# Patient Record
Sex: MALE | Race: WHITE | Employment: FULL TIME | URBAN - METROPOLITAN AREA
[De-identification: names, ages, dates, MRNs, and addresses within clinical notes are randomized per-mention and may not be internally consistent; named-entity substitution may affect disease eponyms.]

---

## 2020-04-14 ENCOUNTER — APPOINTMENT (OUTPATIENT)
Dept: GENERAL RADIOLOGY | Age: 35
End: 2020-04-14

## 2020-04-14 ENCOUNTER — HOSPITAL ENCOUNTER (EMERGENCY)
Age: 35
Discharge: HOME | End: 2020-04-14
Attending: EMERGENCY MEDICINE

## 2020-04-14 VITALS
TEMPERATURE: 98.9 F | HEART RATE: 97 BPM | OXYGEN SATURATION: 99 % | WEIGHT: 284.39 LBS | BODY MASS INDEX: 39.81 KG/M2 | RESPIRATION RATE: 20 BRPM | DIASTOLIC BLOOD PRESSURE: 87 MMHG | HEIGHT: 71 IN | SYSTOLIC BLOOD PRESSURE: 135 MMHG

## 2020-04-14 LAB
RAPID INFLUENZA  B AGN: NEGATIVE
RAPID INFLUENZA A AGN: NEGATIVE

## 2020-04-14 PROCEDURE — 99284 EMERGENCY DEPT VISIT MOD MDM: CPT

## 2020-04-14 PROCEDURE — 87804 INFLUENZA ASSAY W/OPTIC: CPT

## 2020-04-14 PROCEDURE — 71045 X-RAY EXAM CHEST 1 VIEW: CPT

## 2020-04-14 ASSESSMENT — ENCOUNTER SYMPTOMS
WHEEZING: 0
PHOTOPHOBIA: 0
FACIAL SWELLING: 0
BACK PAIN: 0
COLOR CHANGE: 0
VOMITING: 0
SHORTNESS OF BREATH: 1
BLOOD IN STOOL: 0
TROUBLE SWALLOWING: 0
VOICE CHANGE: 0
NAUSEA: 0
STRIDOR: 0
ABDOMINAL PAIN: 0
COUGH: 1

## 2020-04-14 NOTE — ED PROVIDER NOTES
[facility]  eMERGENCY dEPARTMENT eNCOUnter      Pt Name: Teri Pruitt  MRN: [id number]  [place] 1985  Date of evaluation: 4/14/2020  Provider: Carolee Barron MD    [address] [place] [address]       Chief Complaint   Patient presents with    Cough     unproductive cough on and off for few weeks worse today    Shortness of Breath     started today          HISTORY OF PRESENT ILLNESS   (Location/Symptom, Timing/Onset, Context/Setting, Quality, Duration, Modifying Factors, Severity)  Note limiting factors. Teri Pruitt is a 36 y.o. male who denies any significant past medical history specifically denying any chronic lung disease or immunosuppression who presents with on and off nonproductive cough for approximately 2 weeks which became worse today. He also reports shortness of breath started today. The patient denies any fever, body aches, chest pain, hemoptysis, or leg swelling. He denies any syncope or severe increased work of breathing. He reports that his cough and shortness breath are moderate, constant, and worsening. He denies any known aggravating or alleviating factors. HPI    Nursing Notes were reviewed. REVIEW OFSYSTEMS    (2-9 systems for level 4, 10 or more for level 5)     Review of Systems   Constitutional: Negative for appetite change, fever and unexpected weight change. HENT: Negative for facial swelling, trouble swallowing and voice change. Eyes: Negative for photophobia and visual disturbance. Respiratory: Positive for cough and shortness of breath. Negative for wheezing and stridor. Cardiovascular: Negative for chest pain and palpitations. Gastrointestinal: Negative for abdominal pain, blood in stool, nausea and vomiting. Genitourinary: Negative for difficulty urinating and dysuria. Musculoskeletal: Negative for back pain, gait problem and neck pain. Skin: Negative for color change and wound.    Neurological: Negative for seizures, syncope and strict ER return precautions. Covid Decompensation Risk Scale    Screen for Imminent Risk:  -O2 Sat <95%, SBP <100 or DBP <60? NO  -RR >22? NO  -Age 61+ AND Pulse >100 at time of disposition? NO fir age      Clinical Risk Score:                                                                                                                       -Age: <49 = 0 pts; 50-59 = 2 pts; 60-69 = 3 pts;70+ = 4 pts                                                                  -Tachycardia, Long Term Care facility = 4 pts each    -Non-exertional Dyspnea or change in exercise tolerance: 6 pts    -Symptom onset (Days): <5 or >10 = 0 pts; 5-10 = 4 pts    -Male, CAD, DM, cancer, immunosuppression, cerebrovascular dz,  chronic lung dz*(exclude Asthma), chronic renal dz, chronic liver dz= 1 pt each    Total 5    Score ? 8: Low Risk  Workup: CXR ? Normal = low risk  If CXR shows infiltrate go to Intermediate Risk    Score 9-12: Intermediate Risk  Workup: CBC, CMP, Troponin, LDH, Lactate, CXR  If any below present, go to High Risk  GCS <15, > mild infiltrate, lobar infiltrate, multi-lobar infiltrate, lymphopenia, ? LDH, ? troponin, ?lactate, thrombocytopenia. Score >12:  High Risk? Admit. Procedures    FINAL IMPRESSION      1. Cough    2. Shortness of breath          DISPOSITION/PLAN   DISPOSITION Decision To Discharge 04/14/2020 03:50:40 PM      PATIENT REFERRED TO:  María Houser  [phone]  In 1 week  Ask for an appointment with a primary care doctor    [facility]  [address]  [phone]    If symptoms worsen        (Please note that portions of this note were completed with a voice recognition program.  Efforts were made to edit the dictations but occasionally words aremis-transcribed. )    Prince Jessica MD (electronically signed)  Attending Emergency Physician         Prince Jessica MD  04/14/20 1600

## 2020-04-15 ENCOUNTER — CARE COORDINATION (OUTPATIENT)
Dept: CARE COORDINATION | Age: 35
End: 2020-04-15

## 2020-04-15 NOTE — CARE COORDINATION
[facility]. CTN/ACM provided contact information for future reference. Reviewed and educated patient on any new and changed medications related to discharge diagnosis     Patient/family/caregiver given information for GetWell Loop and agrees to enroll yes  Patient's preferred e-mail: [email or web address]   Patient's preferred phone number: [phone]  Based on Loop alert triggers, patient will be contacted by nurse care manager for worsening symptoms. Plan for follow-up call in 3-5 days based on severity of symptoms and risk factors.

## 2023-02-24 ENCOUNTER — OFFICE VISIT (OUTPATIENT)
Dept: URGENT CARE | Age: 38
End: 2023-02-24

## 2023-02-24 VITALS
OXYGEN SATURATION: 95 % | TEMPERATURE: 100.2 F | SYSTOLIC BLOOD PRESSURE: 136 MMHG | HEART RATE: 131 BPM | WEIGHT: 315 LBS | RESPIRATION RATE: 14 BRPM | DIASTOLIC BLOOD PRESSURE: 86 MMHG | HEIGHT: 71 IN | BODY MASS INDEX: 44.1 KG/M2

## 2023-02-24 DIAGNOSIS — U07.1 COVID-19: Primary | ICD-10-CM

## 2023-02-24 DIAGNOSIS — R05.1 ACUTE COUGH: ICD-10-CM

## 2023-02-24 LAB
Lab: ABNORMAL
PERFORMING INSTRUMENT: ABNORMAL
QC PASS/FAIL: ABNORMAL
SARS-COV-2, POC: DETECTED

## 2023-02-24 ASSESSMENT — ENCOUNTER SYMPTOMS: COUGH: 1

## 2023-02-24 NOTE — PATIENT INSTRUCTIONS
Get plenty of rest, drink lots of fluid   Continue OTC medications as needed  Follow up if symptoms do not improve or worsen

## 2023-02-24 NOTE — PROGRESS NOTES
Enriqueta Diez (:  1985) is a 40 y.o. male,New patient, here for evaluation of the following chief complaint(s):  Congestion, Cough, Fever, Chest Pain, and Generalized Body Aches (Symptoms x 2 days. Had covid positive test 1/2 hour ago)      ASSESSMENT/PLAN:  1. COVID-19  Discussed benefit vs. Risk with Paxlovid with patient. Symptoms are improving on own and patient's only risk factor is obesity. Patient decided against Rx  Get plenty of rest, drink lots of fluid   Continue OTC medications as needed  Follow up if symptoms do not improve or worsen  Work note and Covid results provided    2. Acute cough  Rapid Covid positive  - POCT COVID-19, Antigen       Return if symptoms worsen or fail to improve. SUBJECTIVE/OBJECTIVE:  Patient comes in today for Paxlovid prescription. Patient had positive home Covid test today. Is on day three of Congestion, cough, bodyaches and fever. States symptoms are improving on their own. History provided by:  Patient   used: No    Cough  Associated symptoms include chest pain and a fever. Fever   Associated symptoms include chest pain and coughing. Chest Pain   Associated symptoms include a cough and a fever. Generalized Body Aches  Associated symptoms: chest pain, cough and fever      Vitals:    23 1706   BP: 136/86   Site: Right Upper Arm   Position: Sitting   Pulse: (!) 131   Resp: 14   Temp: 100.2 °F (37.9 °C)   TempSrc: Oral   SpO2: 95%   Weight: (!) 320 lb (145.2 kg)   Height: 5' 11\" (1.803 m)       Review of Systems   Constitutional:  Positive for fever. Respiratory:  Positive for cough. Cardiovascular:  Positive for chest pain. Physical Exam  Vitals reviewed. Constitutional:       Appearance: Normal appearance. HENT:      Head: Normocephalic and atraumatic. Cardiovascular:      Rate and Rhythm: Regular rhythm. Tachycardia present. Pulses: Normal pulses. Heart sounds: Normal heart sounds.    Pulmonary: Effort: Pulmonary effort is normal.   Skin:     General: Skin is warm and dry. Neurological:      Mental Status: He is alert and oriented to person, place, and time. An electronic signature was used to authenticate this note.     --KESHAWN Herman - CNP

## 2023-02-24 NOTE — LETTER
[facility]  [address] [place] [id number]  Phone: [phone]  Fax: [phone]    KESHAWN Villarreal CNP        February 24, 2023     Patient: Aimee Colin   YOB: 1985   Date of Visit: 2/24/2023       To Whom It May Concern: It is my medical opinion that Aimee Colin may return to work on 2/27/2023. He should mask until 3/3/2023    If you have any questions or concerns, please don't hesitate to call.     Sincerely,        KESHAWN Villarreal CNP

## 2024-10-28 ENCOUNTER — OFFICE VISIT (OUTPATIENT)
Dept: URGENT CARE | Age: 39
End: 2024-10-28

## 2024-10-28 VITALS
HEART RATE: 130 BPM | OXYGEN SATURATION: 97 % | BODY MASS INDEX: 43.96 KG/M2 | HEIGHT: 71 IN | TEMPERATURE: 99 F | SYSTOLIC BLOOD PRESSURE: 118 MMHG | WEIGHT: 314 LBS | DIASTOLIC BLOOD PRESSURE: 80 MMHG

## 2024-10-28 DIAGNOSIS — H10.31 ACUTE BACTERIAL CONJUNCTIVITIS OF RIGHT EYE: Primary | ICD-10-CM

## 2024-10-28 DIAGNOSIS — H57.89 EYE DISCHARGE: ICD-10-CM

## 2024-10-28 DIAGNOSIS — H00.021 HORDEOLUM INTERNUM OF RIGHT UPPER EYELID: ICD-10-CM

## 2024-10-28 RX ORDER — POLYMYXIN B SULFATE AND TRIMETHOPRIM 1; 10000 MG/ML; [USP'U]/ML
1 SOLUTION OPHTHALMIC EVERY 4 HOURS
Qty: 10 ML | Refills: 0 | Status: SHIPPED | OUTPATIENT
Start: 2024-10-28 | End: 2024-11-04

## 2024-10-28 ASSESSMENT — ENCOUNTER SYMPTOMS
EYE PAIN: 1
EYE REDNESS: 1
EYE ITCHING: 1
EYE DISCHARGE: 1

## 2024-10-28 NOTE — PATIENT INSTRUCTIONS
Acute conjunctivitis of right eye secondary to hordeolum internum  Polytrim antibiotic drops prescribed for the eye infection - use as directed.  After use of the antibiotics for over 24 hours, patient is no longer considered contagious.  Encourage warm, moist compresses over the eye for relief of discomfort  Can also be used to gently wipe away any collecting or dried discharge  Ibuprofen or Tylenol for any pain relief, as necessary, per packaging instructions.  Follow up with PCP or with an eye doctor for further evaluation if symptoms persist beyond 3 days, or if the symptoms show signs of worsening despite the use of the prescribed treatment.  If you develop worsening eye pain, changes in vision, significant headaches, or redness/swelling around the eye, fevers, chills, body aches, nausea, vomiting, or other concerning changes in symptoms, follow up with the emergency room for further evaluation.    New Prescriptions    TRIMETHOPRIM-POLYMYXIN B (POLYTRIM) 91483-7.1 UNIT/ML-% OPHTHALMIC SOLUTION    Place 1 drop into the right eye every 4 hours for 7 days

## 2024-10-28 NOTE — PROGRESS NOTES
Andrew Shanks (: 1985) is a 39 y.o. male, Established patient, here for evaluation of the following chief complaint(s):  Conjunctivitis (Woke up this morning with red, draining, itching, burning rt eye/? Pink eye)      ASSESSMENT/PLAN:    ICD-10-CM    1. Acute bacterial conjunctivitis of right eye  H10.31 trimethoprim-polymyxin b (POLYTRIM) 85683-0.1 UNIT/ML-% ophthalmic solution      2. Hordeolum internum of right upper eyelid  H00.021       3. Eye discharge  H57.89           Acute conjunctivitis of right eye secondary to hordeolum internum  Exam concerning for hordeolum interim of the right upper eyelid and bacterial conjunctivitis with of right eye with injection, erythema, and discomfort, as well as purulent appearing discharge from the eye.  No concerns for corneal abrasions, retained foreign objects within the eye(s), blepharitis, facial, orbital, or periorbital cellulitis, ocular trauma, and acute angle glaucoma.  Polytrim  antibiotic ophthalmic drops prescribed for antibiotic treatment of the infection.  Warm, moist compresses for relief of the eye discomfort, along with for use of gently wiping away any collecting eye discharge.  Follow up with ophthalmology for persistent symptoms.  Strict ED follow up instructions for any worsening symptoms.    Discussed PCP follow up for persisting or worsening symptoms, or to return to the clinic if unable to obtain PCP follow up for worsening symptoms.    The patient tolerated their visit well. The patient and/or the family were informed of the results of any tests, a time was given to answer questions, a plan was proposed and they agreed with plan. Reviewed AVS with treatment instructions and answered questions - pt/family expresses understanding and agreement with the discussed treatment plan and AVS instructions.      SUBJECTIVE/OBJECTIVE:  HPI:   39 y.o. male presents for complaint of right eye watery, swollen and burning and itching with an onset of